# Patient Record
Sex: FEMALE | Race: OTHER | Employment: UNEMPLOYED | URBAN - METROPOLITAN AREA
[De-identification: names, ages, dates, MRNs, and addresses within clinical notes are randomized per-mention and may not be internally consistent; named-entity substitution may affect disease eponyms.]

---

## 2022-04-22 ENCOUNTER — APPOINTMENT (OUTPATIENT)
Dept: GENERAL RADIOLOGY | Age: 57
End: 2022-04-22

## 2022-04-22 ENCOUNTER — HOSPITAL ENCOUNTER (EMERGENCY)
Age: 57
Discharge: HOME | End: 2022-04-22

## 2022-04-22 VITALS
BODY MASS INDEX: 24.41 KG/M2 | SYSTOLIC BLOOD PRESSURE: 152 MMHG | WEIGHT: 143 LBS | RESPIRATION RATE: 18 BRPM | OXYGEN SATURATION: 98 % | HEIGHT: 64 IN | TEMPERATURE: 97.9 F | DIASTOLIC BLOOD PRESSURE: 86 MMHG | HEART RATE: 90 BPM

## 2022-04-22 DIAGNOSIS — M54.32 SCIATICA OF LEFT SIDE: ICD-10-CM

## 2022-04-22 DIAGNOSIS — M65.20 CALCIUM DEPOSITS IN TENDON AND BURSA: Primary | ICD-10-CM

## 2022-04-22 PROCEDURE — 96372 THER/PROPH/DIAG INJ SC/IM: CPT

## 2022-04-22 PROCEDURE — 6360000002 HC RX W HCPCS: Performed by: PHYSICIAN ASSISTANT

## 2022-04-22 PROCEDURE — 6370000000 HC RX 637 (ALT 250 FOR IP): Performed by: PHYSICIAN ASSISTANT

## 2022-04-22 PROCEDURE — 99284 EMERGENCY DEPT VISIT MOD MDM: CPT

## 2022-04-22 PROCEDURE — 72110 X-RAY EXAM L-2 SPINE 4/>VWS: CPT

## 2022-04-22 PROCEDURE — 73060 X-RAY EXAM OF HUMERUS: CPT

## 2022-04-22 PROCEDURE — 73030 X-RAY EXAM OF SHOULDER: CPT

## 2022-04-22 RX ORDER — HYDROCODONE BITARTRATE AND ACETAMINOPHEN 5; 325 MG/1; MG/1
1 TABLET ORAL EVERY 8 HOURS PRN
Qty: 9 TABLET | Refills: 0 | Status: SHIPPED | OUTPATIENT
Start: 2022-04-22 | End: 2022-04-25

## 2022-04-22 RX ORDER — KETOROLAC TROMETHAMINE 30 MG/ML
30 INJECTION, SOLUTION INTRAMUSCULAR; INTRAVENOUS ONCE
Status: COMPLETED | OUTPATIENT
Start: 2022-04-22 | End: 2022-04-22

## 2022-04-22 RX ORDER — ORPHENADRINE CITRATE 30 MG/ML
60 INJECTION INTRAMUSCULAR; INTRAVENOUS ONCE
Status: COMPLETED | OUTPATIENT
Start: 2022-04-22 | End: 2022-04-22

## 2022-04-22 RX ORDER — HYDROCODONE BITARTRATE AND ACETAMINOPHEN 5; 325 MG/1; MG/1
1 TABLET ORAL ONCE
Status: COMPLETED | OUTPATIENT
Start: 2022-04-22 | End: 2022-04-22

## 2022-04-22 RX ADMIN — HYDROCODONE BITARTRATE AND ACETAMINOPHEN 1 TABLET: 5; 325 TABLET ORAL at 18:51

## 2022-04-22 RX ADMIN — ORPHENADRINE CITRATE 60 MG: 30 INJECTION INTRAMUSCULAR; INTRAVENOUS at 18:54

## 2022-04-22 RX ADMIN — KETOROLAC TROMETHAMINE 30 MG: 30 INJECTION, SOLUTION INTRAMUSCULAR at 18:54

## 2022-04-22 ASSESSMENT — PAIN DESCRIPTION - LOCATION
LOCATION: ARM;SHOULDER
LOCATION: ARM;SHOULDER
LOCATION: ARM;BACK
LOCATION: SHOULDER;ARM

## 2022-04-22 ASSESSMENT — PAIN SCALES - GENERAL
PAINLEVEL_OUTOF10: 10
PAINLEVEL_OUTOF10: 10
PAINLEVEL_OUTOF10: 7
PAINLEVEL_OUTOF10: 5
PAINLEVEL_OUTOF10: 10

## 2022-04-22 ASSESSMENT — PAIN DESCRIPTION - DESCRIPTORS
DESCRIPTORS: ACHING
DESCRIPTORS: ACHING

## 2022-04-22 ASSESSMENT — PAIN DESCRIPTION - ORIENTATION
ORIENTATION: RIGHT

## 2022-04-22 ASSESSMENT — PAIN - FUNCTIONAL ASSESSMENT: PAIN_FUNCTIONAL_ASSESSMENT: 0-10

## 2022-04-22 ASSESSMENT — PAIN DESCRIPTION - PAIN TYPE
TYPE: ACUTE PAIN;CHRONIC PAIN
TYPE: ACUTE PAIN

## 2022-04-22 ASSESSMENT — PAIN DESCRIPTION - ONSET: ONSET: ON-GOING

## 2022-04-22 ASSESSMENT — PAIN DESCRIPTION - FREQUENCY: FREQUENCY: CONTINUOUS

## 2022-04-22 NOTE — ED TRIAGE NOTES
Pt presents to ED c/o back pain and right arm pain x1 month. Pt states she fell 1 month ago. Pt ambulatory to triage.

## 2022-04-22 NOTE — ED NOTES
Returned. States she's feeling a little better. Visitor at bedside. Call light within reach.      Caden Cisneros RN  04/22/22 1989

## 2022-04-27 ASSESSMENT — ENCOUNTER SYMPTOMS
VOMITING: 0
SORE THROAT: 0
SHORTNESS OF BREATH: 0
ABDOMINAL PAIN: 0
DIARRHEA: 0
COUGH: 0
EYE PAIN: 0
BACK PAIN: 1
RHINORRHEA: 0
PHOTOPHOBIA: 0
NAUSEA: 0

## 2022-04-27 NOTE — ED PROVIDER NOTES
[facility] ED  EMERGENCY DEPARTMENT ENCOUNTER      Pt Name: Gerson Matias  MRN: [id number]  [place] 1965  Date of evaluation: 4/22/2022  Provider: Giovanni Patton PA-C      HISTORY OF PRESENT ILLNESS    Gerson Matias is a 60 y.o. female who presents to the Emergency Department with right shoulder pain and left sided back pain. Pain both started about 1 week ago. She is sleeping in a new place. She denies any injury or trauma. She has h/o rotator cuff tear years ago in [place]. She has h/o sciatica but usually on the right side. She tells me norflex and toradol usually helps her. She denies any upper or lower extremity weakness. [place]. REVIEW OF SYSTEMS       Review of Systems   Constitutional: Negative for chills, diaphoresis, fatigue and fever. HENT: Negative for congestion, rhinorrhea and sore throat. Eyes: Negative for photophobia and pain. Respiratory: Negative for cough and shortness of breath. Cardiovascular: Negative for chest pain and palpitations. Gastrointestinal: Negative for abdominal pain, diarrhea, nausea and vomiting. Genitourinary: Negative for dysuria and flank pain. Musculoskeletal: Positive for arthralgias and back pain. Skin: Negative for rash. Neurological: Negative for dizziness, light-headedness and headaches. Psychiatric/Behavioral: Negative. All other systems reviewed and are negative. PAST MEDICAL HISTORY     Past Medical History:   Diagnosis Date    Hypertension          SURGICAL HISTORY     History reviewed. No pertinent surgical history. CURRENT MEDICATIONS       Discharge Medication List as of 4/22/2022  9:01 PM          ALLERGIES     Dexamethasone, Gabapentin, and Sentra am [actical]    FAMILY HISTORY     History reviewed. No pertinent family history.        SOCIAL HISTORY       Social History     Socioeconomic History    Marital status: Single     Spouse name: None    Number of children: None    Years of education: None    Highest education level: None   Occupational History    None   Tobacco Use    Smoking status: Never Smoker    Smokeless tobacco: Never Used   Substance and Sexual Activity    Alcohol use: None    Drug use: None    Sexual activity: None   Other Topics Concern    None   Social History Narrative    None     Social Determinants of Health     Financial Resource Strain:     Difficulty of Paying Living Expenses: Not on file   Food Insecurity:     Worried About Running Out of Food in the Last Year: Not on file    Theodore of Food in the Last Year: Not on file   Transportation Needs:     Lack of Transportation (Medical): Not on file    Lack of Transportation (Non-Medical):  Not on file   Physical Activity:     Days of Exercise per Week: Not on file    Minutes of Exercise per Session: Not on file   Stress:     Feeling of Stress : Not on file   Social Connections:     Frequency of Communication with Friends and Family: Not on file    Frequency of Social Gatherings with Friends and Family: Not on file    Attends Buddhist Services: Not on file    Active Member of [address] [place] [address] or Organizations: Not on file    Attends Club or Organization Meetings: Not on file    Marital Status: Not on file   Intimate Partner Violence:     Fear of Current or Ex-Partner: Not on file    Emotionally Abused: Not on file    Physically Abused: Not on file    Sexually Abused: Not on file   Housing Stability:     Unable to Pay for Housing in the Last Year: Not on file    Number of [place] in the Last Year: Not on file    Unstable Housing in the Last Year: Not on file       SCREENINGS    [place] Coma Scale  Eye Opening: Spontaneous  Best Verbal Response: Oriented  Best Motor Response: Obeys commands  [place] Coma Scale Score: 15        PHYSICAL EXAM    (up to 7 for level 4, 8 or more for level 5)     ED Triage Vitals [04/22/22 1813]   BP Temp Temp Source Pulse Resp SpO2 Height Weight   (!) 152/86 97.9 °F (36.6 °C) Temporal 90 19 98 % 5' 4\" (1.626 m) 143 lb (64.9 kg)       Physical Exam  Vitals and nursing note reviewed. Constitutional:       General: She is not in acute distress. Appearance: Normal appearance. She is well-developed. She is not diaphoretic. HENT:      Head: Normocephalic and atraumatic. Eyes:      General: Lids are normal.      Conjunctiva/sclera: Conjunctivae normal.   Cardiovascular:      Rate and Rhythm: Normal rate and regular rhythm. Pulses: Normal pulses. Heart sounds: Normal heart sounds. Pulmonary:      Effort: Pulmonary effort is normal.      Breath sounds: Normal breath sounds. Abdominal:      General: Bowel sounds are normal.      Palpations: Abdomen is soft. Tenderness: There is no abdominal tenderness. Musculoskeletal:      Right shoulder: Tenderness and bony tenderness present. Arms:       Cervical back: Normal range of motion and neck supple. Lumbar back: Tenderness present. Back:       Comments: No redness wounds swelling. B/l equal hand grasps and push and pulls. Generalized ttp of rue. Pain elicited with rom. B/l equal strength lower extremities. 2+distal pulse. [place]. Ambulates independently to xray. Lymphadenopathy:      Cervical: No cervical adenopathy. Skin:     General: Skin is warm and dry. Capillary Refill: Capillary refill takes less than 2 seconds. Findings: No rash. Neurological:      Mental Status: She is alert and oriented to person, place, and time. Psychiatric:         Thought Content: Thought content normal.         Judgment: Judgment normal.           All other labs were within normal range or not returned as of this dictation.     EMERGENCY DEPARTMENT COURSE and DIFFERENTIALDIAGNOSIS/MDM:   Vitals:    Vitals:    04/22/22 1813 04/22/22 1851   BP: (!) 152/86    Pulse: 90    Resp: 19 18   Temp: 97.9 °F (36.6 °C)    TempSrc: Temporal    SpO2: 98%    Weight: 143 lb (64.9 kg)    Height: 5' 4\" (1.626 m)           Pt declined  as family is with her and will interpret. Medicated in ed for pain and feels significantly improved. She is able to ambulate to xray. She has no motor or neuro deficits on exam. Xray reveals chronic calcium depositis which is consistent with her prior rotator cuff injury years ago. Likely msk pain. Will dc home with meds and instructed to return to ed for new wroseing or concerning symptoms. Pt stable and ready for d/c       PROCEDURES:  Unless otherwise noted below, none     Procedures      FINAL IMPRESSION      1. Calcium deposits in tendon and bursa    2.  Sciatica of left side          DISPOSITION/PLAN   DISPOSITION Decision To Discharge 04/22/2022 08:56:34 PM          Winnie Stallings (electronically signed)  Attending Emergency Physician       Lizzie Harman PA-C  04/27/22 0041